# Patient Record
Sex: FEMALE | Race: WHITE | NOT HISPANIC OR LATINO | Employment: FULL TIME | URBAN - METROPOLITAN AREA
[De-identification: names, ages, dates, MRNs, and addresses within clinical notes are randomized per-mention and may not be internally consistent; named-entity substitution may affect disease eponyms.]

---

## 2020-06-04 RX ORDER — SPIRONOLACTONE 50 MG/1
TABLET, FILM COATED ORAL
Qty: 90 TABLET | Refills: 3 | OUTPATIENT
Start: 2020-06-04

## 2020-06-04 NOTE — TELEPHONE ENCOUNTER
Called patient to see if she needed a refill and she stated she no longer takes this spironolactone, orilissa helped with fluid retention etc...

## 2020-06-18 ENCOUNTER — TELEPHONE (OUTPATIENT)
Dept: OBSTETRICS AND GYNECOLOGY | Facility: CLINIC | Age: 45
End: 2020-06-18

## 2020-06-18 DIAGNOSIS — R10.2 PELVIC PAIN: Primary | ICD-10-CM

## 2020-06-18 NOTE — TELEPHONE ENCOUNTER
----- Message from Pushpa Wen sent at 6/18/2020 11:30 AM CDT -----  .Type:  Needs Medical Advice    Who Called:  Patient   Symptoms (please be specific):  medication not working  ( orisilla )  How long has patient had these symptoms:    Pharmacy name and phone #:    Would the patient rather a call back or a response via [facility]? call  Best Call Back Number:  [phone]  Additional Information:  Patient called in regards to maybe getting an high dose or if the medication is not working anymore.

## 2020-06-18 NOTE — TELEPHONE ENCOUNTER
Called and spoke with patient. Pt reports having lower back pain that radiates down legs for the past 2 weeks. She has been on the Orilissa since April 2019 and worked well. Not sure if she needs to increase her dose? What do you recommend pt to do?

## 2020-06-19 NOTE — TELEPHONE ENCOUNTER
Called patient to schedule appointments. Dr. Resendez had no availability until mid July. Scheduled pt with Jessica and u/s for evaluation. Vilma Moore

## 2020-06-24 ENCOUNTER — PROCEDURE VISIT (OUTPATIENT)
Dept: OBSTETRICS AND GYNECOLOGY | Facility: CLINIC | Age: 45
End: 2020-06-24
Payer: COMMERCIAL

## 2020-06-24 ENCOUNTER — OFFICE VISIT (OUTPATIENT)
Dept: OBSTETRICS AND GYNECOLOGY | Facility: CLINIC | Age: 45
End: 2020-06-24
Payer: COMMERCIAL

## 2020-06-24 ENCOUNTER — TELEPHONE (OUTPATIENT)
Dept: OBSTETRICS AND GYNECOLOGY | Facility: CLINIC | Age: 45
End: 2020-06-24

## 2020-06-24 VITALS
HEIGHT: 62 IN | WEIGHT: 176 LBS | SYSTOLIC BLOOD PRESSURE: 110 MMHG | DIASTOLIC BLOOD PRESSURE: 84 MMHG | BODY MASS INDEX: 32.39 KG/M2

## 2020-06-24 DIAGNOSIS — R10.2 PELVIC PAIN: ICD-10-CM

## 2020-06-24 DIAGNOSIS — N80.9 ENDOMETRIOSIS: Primary | ICD-10-CM

## 2020-06-24 PROCEDURE — 99214 PR OFFICE/OUTPT VISIT, EST, LEVL IV, 30-39 MIN: ICD-10-PCS | Mod: 25,S$GLB,, | Performed by: NURSE PRACTITIONER

## 2020-06-24 PROCEDURE — 99214 OFFICE O/P EST MOD 30 MIN: CPT | Mod: 25,S$GLB,, | Performed by: NURSE PRACTITIONER

## 2020-06-24 PROCEDURE — 3008F PR BODY MASS INDEX (BMI) DOCUMENTED: ICD-10-PCS | Mod: CPTII,S$GLB,, | Performed by: NURSE PRACTITIONER

## 2020-06-24 PROCEDURE — 3008F BODY MASS INDEX DOCD: CPT | Mod: CPTII,S$GLB,, | Performed by: NURSE PRACTITIONER

## 2020-06-24 PROCEDURE — 76830 TRANSVAGINAL US NON-OB: CPT | Mod: S$GLB,,, | Performed by: OBSTETRICS & GYNECOLOGY

## 2020-06-24 PROCEDURE — 76830 PR  ECHOGRAPHY,TRANSVAGINAL: ICD-10-PCS | Mod: S$GLB,,, | Performed by: OBSTETRICS & GYNECOLOGY

## 2020-06-24 RX ORDER — ELAGOLIX 150 MG/1
TABLET, FILM COATED ORAL
COMMUNITY
Start: 2020-05-25 | End: 2020-06-24

## 2020-06-24 RX ORDER — ELAGOLIX 200 MG/1
200 TABLET, FILM COATED ORAL 2 TIMES DAILY
Qty: 60 TABLET | Refills: 11 | Status: SHIPPED | OUTPATIENT
Start: 2020-06-24 | End: 2020-10-27 | Stop reason: SDUPTHER

## 2020-06-24 NOTE — PROGRESS NOTES
Subjective:       Patient ID: Ely Petersen is a 44 y.o. female.    Chief Complaint:  Pelvic Pain (Pt has hx of endometriosis. Onset 3 weeks. Pt reports a steady, stabbing pain in back and radiated down leg.)      History of Present Illness  HPI  lower back pain radiates to the pelvic area and down the legs, makes legs numb. Reports that it feels like her endo pain. Makes her tired, heating pad helps, taking aleve, tylenol and they dont help, 800 mg ibuprofen helps just for the day. Pain is a 6/10. Pain is constant. Did not injure back.    GYN & OB History  No LMP recorded (lmp unknown). Patient has had an ablation.   Date of Last Pap: No result found    OB History    Para Term  AB Living   0 0 0 0 0 0   SAB TAB Ectopic Multiple Live Births   0 0 0 0 0       Review of Systems  Review of Systems   Constitutional: Negative for activity change, appetite change, chills, fatigue and fever.   HENT: Negative for nasal congestion and tinnitus.    Eyes: Negative for visual disturbance.   Respiratory: Negative for cough and shortness of breath.    Cardiovascular: Negative for chest pain and palpitations.   Gastrointestinal: Negative for abdominal pain, bloating, blood in stool, constipation, nausea and vomiting.   Endocrine: Negative for diabetes, hair loss and hot flashes.   Genitourinary: Positive for pelvic pain. Negative for bladder incontinence, decreased libido, dysmenorrhea, dyspareunia, dysuria, flank pain, frequency, genital sores, hematuria, hot flashes, menorrhagia, menstrual problem, urgency, vaginal bleeding, vaginal discharge, vaginal pain, urinary incontinence, postcoital bleeding, postmenopausal bleeding, vaginal dryness and vaginal odor.        Radiates to the pelvic area and down the legs, makes legs numb. Reports that it feels like her endo pain. Makes her tired, heating pad helps, taking aleve, tylenol and they dont help, 800 mg ibuprofen helps just for the day. Pain is a 6/10. Pain is  constant. Did not injure back.  Has been on orilissa for about a year and it was helping until three weeks ago, affecting daily living and desire to do anything    Musculoskeletal: Positive for back pain. Negative for arthralgias, leg pain and myalgias.   Integumentary:  Negative for rash, acne, hair changes, mole/lesion, breast mass, nipple discharge, breast skin changes and breast tenderness.   Neurological: Negative for vertigo, syncope, numbness and headaches.   Hematological: Does not bruise/bleed easily.   Psychiatric/Behavioral: Negative for depression and sleep disturbance. The patient is not nervous/anxious.    Breast: Negative for asymmetry, lump, mass, mastodynia, nipple discharge, skin changes and tenderness          Objective:    Physical Exam:   Constitutional: She appears well-developed and well-nourished.    HENT:   Nose: No epistaxis.              Genitourinary:    Vagina and uterus normal.      Pelvic exam was performed with patient supine.   Cervix is normal. Right adnexum displays tenderness. Right adnexum displays no mass and no fullness. Left adnexum displays no mass, no tenderness and no fullness. No erythema, tenderness, bleeding, rectocele, cystocele or unspecified prolapse of vaginal walls in the vagina.    No foreign body in the vagina.      No signs of injury in the vagina.   Labial bartholins normal.Cervix exhibits no motion tenderness, no discharge and no friability.                        Assessment:     Endometriosis  Pelvic pain  Back pain          Plan:      Increase orilissa to 200 mg po bid and refer to thomasee per prestia

## 2020-06-24 NOTE — PATIENT INSTRUCTIONS
Living with Endometriosis     A hot bath may help relieve pain.    Once you know you have endometriosis, you can think about your options for treatment. Even after treatment, most women have symptoms off and on until menopause. Then, when monthly periods are over for good, symptoms tend to subside or disappear. In the meantime, there is a lot you can do to help yourself feel better.  Help with emotions  Along with cycles of pain, you may have emotional cycles or mood swings. You may feel frustrated, or depressed. Dont suffer in silence. Talking to someone you trust can really help. Also, spend time doing things you enjoy.  Pain control  Heat can help limit pain. Soak in a hot bath or use a heating pad. You may also find relief with yoga, meditation, or acupuncture. Acetaminophen and ibuprofen may also help. Those work best if taken just as pain begins. If needed, you may be given prescription medicine to reduce cramping and pain during periods. Keep track of your symptoms to help you anticipate and cope with the pain.  Nutrition  For some women, making certain changes in their diet seems to reduce symptoms:  · Eat less refined sugar and white flour.  · Eat more dairy and get adequate vitamin D.  · Choose whole-grain breads and cereals.  · Eat at least 5 fruits and vegetables each day.  · Talk with your health care provider about taking nutritional supplements.  Pregnancy  Following treatment, many women with endometriosis are able to become pregnant. Some of these women find that being pregnant relieves symptoms -- at least for a while.  Exercise  Frequent exercise can help control your symptoms. Try to exercise about 2 hours and 30 minutes a week. Doing so can help relieve pain, including cramps. Nonimpact choices may offer the most symptom relief. Try walking, swimming, or biking.  Talking about sex  Many women with endometriosis have pain during intercourse. To increase comfort, you may want to try new  positions for sex. Some times of the month may be better than others. Also, talk with your partner about other ways you can be intimate. Massage might be a good option for both of you.  Date Last Reviewed: 5/20/2015  © 5827-8913 [employer]. [address] [place] [address], [place] [address]. All rights reserved. This information is not intended as a substitute for professional medical care. Always follow your healthcare professional's instructions.      We will increase orilissa to 200 mg po bid and refer to Dr Crump

## 2020-06-24 NOTE — TELEPHONE ENCOUNTER
----- Message from Jessica Clements NP sent at 6/24/2020  2:59 PM CDT -----  Can you please refer her to Dr miramontes, DX chronic endo, failing on orilissa. I can not do it through the computer    Thanks   Jessica

## 2020-06-29 ENCOUNTER — TELEPHONE (OUTPATIENT)
Dept: OBSTETRICS AND GYNECOLOGY | Facility: CLINIC | Age: 45
End: 2020-06-29

## 2020-06-29 DIAGNOSIS — R10.2 PELVIC PAIN: Primary | ICD-10-CM

## 2020-06-29 NOTE — TELEPHONE ENCOUNTER
----- Message from Bhavana Aguillon sent at 6/29/2020  9:19 AM CDT -----  Regarding: Referral  Contact: Patient  Patient was calling to check the status of the referral Dr. Resendez was suppose to provide for her. Please call at Ph .[phone] (home)

## 2020-06-29 NOTE — TELEPHONE ENCOUNTER
I see there's a note that a referral was sent 6/24/20 but there's no referral in the chart. Was this a paper referral that was sent?

## 2020-06-29 NOTE — TELEPHONE ENCOUNTER
Called Dr. Perez's office to see if they had received the referral. I left message for someone to call with this information.

## 2020-10-19 ENCOUNTER — TELEPHONE (OUTPATIENT)
Dept: OBSTETRICS AND GYNECOLOGY | Facility: CLINIC | Age: 45
End: 2020-10-19

## 2020-10-19 NOTE — TELEPHONE ENCOUNTER
Ely came in June 30, she is saying she has not heard from the specialist in [place]. When I go to the referral I cannot find the physician name.

## 2020-10-19 NOTE — TELEPHONE ENCOUNTER
----- Message from Allyn Perez sent at 10/19/2020  8:50 AM CDT -----  Regarding: patient advice  Contact: patient  Patient would like to consult with nurse regarding GYN specialist she was referred to in [place]. Patient stated the specialist never called and she is still having the same problems. Please call back at [phone].

## 2020-10-26 ENCOUNTER — TELEPHONE (OUTPATIENT)
Dept: OBSTETRICS AND GYNECOLOGY | Facility: CLINIC | Age: 45
End: 2020-10-26

## 2020-10-26 NOTE — TELEPHONE ENCOUNTER
----- Message from Karime Ordaz sent at 10/26/2020 10:56 AM CDT -----  Regarding: pt  Pt would like to speak with a nurse. Pt has call a few times and no response. Please call back at [phone]

## 2020-10-27 DIAGNOSIS — N80.9 ENDOMETRIOSIS: ICD-10-CM

## 2020-10-27 RX ORDER — ELAGOLIX 200 MG/1
200 TABLET, FILM COATED ORAL 2 TIMES DAILY
Qty: 60 TABLET | Refills: 11 | Status: SHIPPED | OUTPATIENT
Start: 2020-10-27 | End: 2021-02-18

## 2020-10-27 NOTE — TELEPHONE ENCOUNTER
Called patient to let her know that we have samples of Orilissa. Pt aware and will come by office to .   Vilma Moore

## 2020-11-04 ENCOUNTER — TELEPHONE (OUTPATIENT)
Dept: OBSTETRICS AND GYNECOLOGY | Facility: CLINIC | Age: 45
End: 2020-11-04

## 2020-11-04 NOTE — TELEPHONE ENCOUNTER
----- Message from Allyn Perez sent at 11/4/2020 10:46 AM CST -----  Regarding: patient prescription  Contact: patient  Patient is requesting a call back in regards to a prescription that was called out for her. Patient stated the pharmacy is not answering the phone. Please call back at [phone] (home)

## 2020-11-04 NOTE — TELEPHONE ENCOUNTER
Ely went to the pharmacy to  her Orlissa, it requires a PA. Can we call something else out while we get ball rolling on the PA?

## 2020-11-04 NOTE — TELEPHONE ENCOUNTER
Spoke with patient and she will go to [facility] and check on script. If her insurance denies she will call us back and see what to do next.

## 2020-11-04 NOTE — TELEPHONE ENCOUNTER
----- Message from Allyn Perez sent at 11/4/2020 12:15 PM CST -----  Regarding: patient prescription  Contact: patient  Would like to notify nurse that the pharmacy stated that she is needing a prior authorization for the medication being that the dose changed. Please call back at [phone]

## 2020-11-04 NOTE — TELEPHONE ENCOUNTER
The only other thing to do is a progesterone only BC in the mean time and I thing she is supposed to see tomassee

## 2020-11-05 NOTE — TELEPHONE ENCOUNTER
----- Message from Lillian Alba sent at 10/29/2020  4:18 PM CDT -----  Regarding: Pt advice  Contact: [facility]  [facility] is calling to speak to nurse regarding missing information from referral. States that they need clinic phone number on cover sheet, demographics and clinic notes and test results. Please fax to [phone]. Please call back at [phone]//thank you acc

## 2020-12-23 ENCOUNTER — TELEPHONE (OUTPATIENT)
Dept: OBSTETRICS AND GYNECOLOGY | Facility: CLINIC | Age: 45
End: 2020-12-23

## 2020-12-23 NOTE — TELEPHONE ENCOUNTER
----- Message from Geri Altamirano sent at 12/23/2020  1:08 PM CST -----  Contact: self  She just left the specialist in [place]. Please call to schedule ultrasound with Dr Resendez. Please call back at [phone].

## 2020-12-28 ENCOUNTER — PROCEDURE VISIT (OUTPATIENT)
Dept: OBSTETRICS AND GYNECOLOGY | Facility: CLINIC | Age: 45
End: 2020-12-28
Payer: COMMERCIAL

## 2020-12-28 DIAGNOSIS — R10.2 PELVIC PAIN: ICD-10-CM

## 2020-12-28 DIAGNOSIS — R10.2 PELVIC PAIN: Primary | ICD-10-CM

## 2020-12-28 PROCEDURE — 76830 TRANSVAGINAL US NON-OB: CPT | Mod: S$GLB,,, | Performed by: OBSTETRICS & GYNECOLOGY

## 2020-12-28 PROCEDURE — 76830 PR  ECHOGRAPHY,TRANSVAGINAL: ICD-10-PCS | Mod: S$GLB,,, | Performed by: OBSTETRICS & GYNECOLOGY

## 2021-01-14 ENCOUNTER — TELEPHONE (OUTPATIENT)
Dept: OBSTETRICS AND GYNECOLOGY | Facility: CLINIC | Age: 46
End: 2021-01-14

## 2021-01-14 DIAGNOSIS — N85.7 HEMATOMETRA: ICD-10-CM

## 2021-01-14 DIAGNOSIS — R10.2 PELVIC PAIN: Primary | ICD-10-CM

## 2021-01-21 ENCOUNTER — TELEPHONE (OUTPATIENT)
Dept: OBSTETRICS AND GYNECOLOGY | Facility: CLINIC | Age: 46
End: 2021-01-21

## 2021-01-26 ENCOUNTER — PATIENT MESSAGE (OUTPATIENT)
Dept: OBSTETRICS AND GYNECOLOGY | Facility: CLINIC | Age: 46
End: 2021-01-26

## 2021-01-26 ENCOUNTER — TELEPHONE (OUTPATIENT)
Dept: OBSTETRICS AND GYNECOLOGY | Facility: CLINIC | Age: 46
End: 2021-01-26

## 2021-02-16 ENCOUNTER — TELEPHONE (OUTPATIENT)
Dept: OBSTETRICS AND GYNECOLOGY | Facility: CLINIC | Age: 46
End: 2021-02-16

## 2021-02-17 ENCOUNTER — TELEPHONE (OUTPATIENT)
Dept: OBSTETRICS AND GYNECOLOGY | Facility: CLINIC | Age: 46
End: 2021-02-17

## 2021-02-17 ENCOUNTER — PATIENT MESSAGE (OUTPATIENT)
Dept: OBSTETRICS AND GYNECOLOGY | Facility: CLINIC | Age: 46
End: 2021-02-17

## 2021-02-18 ENCOUNTER — TELEPHONE (OUTPATIENT)
Dept: OBSTETRICS AND GYNECOLOGY | Facility: CLINIC | Age: 46
End: 2021-02-18

## 2021-02-18 ENCOUNTER — OFFICE VISIT (OUTPATIENT)
Dept: OBSTETRICS AND GYNECOLOGY | Facility: CLINIC | Age: 46
End: 2021-02-18
Payer: COMMERCIAL

## 2021-02-18 DIAGNOSIS — N85.7 HEMATOMETRA: Primary | ICD-10-CM

## 2021-02-18 DIAGNOSIS — N80.9 ENDOMETRIOSIS: ICD-10-CM

## 2021-02-18 DIAGNOSIS — R10.2 PELVIC PAIN: ICD-10-CM

## 2021-02-18 PROCEDURE — 99499 UNLISTED E&M SERVICE: CPT | Mod: S$GLB,,, | Performed by: OBSTETRICS & GYNECOLOGY

## 2021-02-18 PROCEDURE — 99499 NO LOS: ICD-10-PCS | Mod: S$GLB,,, | Performed by: OBSTETRICS & GYNECOLOGY

## 2021-02-18 RX ORDER — OXYCODONE AND ACETAMINOPHEN 5; 325 MG/1; MG/1
1 TABLET ORAL EVERY 4 HOURS PRN
Qty: 30 TABLET | Refills: 0 | Status: SHIPPED | OUTPATIENT
Start: 2021-02-18 | End: 2023-10-10

## 2021-03-15 ENCOUNTER — HISTORICAL (OUTPATIENT)
Dept: ADMINISTRATIVE | Facility: HOSPITAL | Age: 46
End: 2021-03-15

## 2021-03-15 LAB
ABS NEUT (OLG): 4.95 X10(3)/MCL (ref 2.1–9.2)
BASOPHILS # BLD AUTO: 0 X10(3)/MCL (ref 0–0.2)
BASOPHILS NFR BLD AUTO: 0 %
BUN SERPL-MCNC: 12.5 MG/DL (ref 7–18.7)
CALCIUM SERPL-MCNC: 8.9 MG/DL (ref 8.4–10.2)
CHLORIDE SERPL-SCNC: 103 MMOL/L (ref 98–107)
CO2 SERPL-SCNC: 26 MMOL/L (ref 22–29)
CREAT SERPL-MCNC: 0.93 MG/DL (ref 0.55–1.02)
CREAT/UREA NIT SERPL: 13
EOSINOPHIL # BLD AUTO: 0 X10(3)/MCL (ref 0–0.9)
EOSINOPHIL NFR BLD AUTO: 1 %
ERYTHROCYTE [DISTWIDTH] IN BLOOD BY AUTOMATED COUNT: 12.9 % (ref 11.5–14.5)
EST CREAT CLEARANCE SER (OHS): 62.27 ML/MIN
GLUCOSE SERPL-MCNC: 66 MG/DL (ref 74–100)
HCT VFR BLD AUTO: 41.1 % (ref 35–46)
HGB BLD-MCNC: 13.1 GM/DL (ref 12–16)
IMM GRANULOCYTES # BLD AUTO: 0.02 10*3/UL
IMM GRANULOCYTES NFR BLD AUTO: 0 %
LYMPHOCYTES # BLD AUTO: 1.6 X10(3)/MCL (ref 0.6–4.6)
LYMPHOCYTES NFR BLD AUTO: 22 %
MCH RBC QN AUTO: 28.9 PG (ref 26–34)
MCHC RBC AUTO-ENTMCNC: 31.9 GM/DL (ref 31–37)
MCV RBC AUTO: 90.7 FL (ref 80–100)
MONOCYTES # BLD AUTO: 0.5 X10(3)/MCL (ref 0.1–1.3)
MONOCYTES NFR BLD AUTO: 6 %
NEUTROPHILS # BLD AUTO: 4.95 X10(3)/MCL (ref 2.1–9.2)
NEUTROPHILS NFR BLD AUTO: 70 %
PLATELET # BLD AUTO: 292 X10(3)/MCL (ref 130–400)
PMV BLD AUTO: 10.8 FL (ref 7.4–10.4)
POTASSIUM SERPL-SCNC: 3.7 MMOL/L (ref 3.5–5.1)
RBC # BLD AUTO: 4.53 X10(6)/MCL (ref 4–5.2)
SARS-COV-2 RNA RESP QL NAA+PROBE: NOT DETECTED
SODIUM SERPL-SCNC: 137 MMOL/L (ref 136–145)
WBC # SPEC AUTO: 7.1 X10(3)/MCL (ref 4.5–11)

## 2021-03-18 ENCOUNTER — HISTORICAL (OUTPATIENT)
Dept: MEDSURG UNIT | Facility: HOSPITAL | Age: 46
End: 2021-03-18

## 2021-03-18 LAB
GROUP & RH: NORMAL
POC BETA-HCG (QUAL): NEGATIVE

## 2021-03-19 LAB
ABS NEUT (OLG): 7.59 X10(3)/MCL (ref 2.1–9.2)
BASOPHILS # BLD AUTO: 0 X10(3)/MCL (ref 0–0.2)
BASOPHILS NFR BLD AUTO: 0 %
EOSINOPHIL # BLD AUTO: 0 X10(3)/MCL (ref 0–0.9)
EOSINOPHIL NFR BLD AUTO: 0 %
ERYTHROCYTE [DISTWIDTH] IN BLOOD BY AUTOMATED COUNT: 13.1 % (ref 11.5–17)
HCT VFR BLD AUTO: 36.3 % (ref 37–47)
HGB BLD-MCNC: 11.5 GM/DL (ref 12–16)
LYMPHOCYTES # BLD AUTO: 1.7 X10(3)/MCL (ref 0.6–4.6)
LYMPHOCYTES NFR BLD AUTO: 17 %
MCH RBC QN AUTO: 28.6 PG (ref 27–31)
MCHC RBC AUTO-ENTMCNC: 31.7 GM/DL (ref 33–36)
MCV RBC AUTO: 90.3 FL (ref 80–94)
MONOCYTES # BLD AUTO: 0.7 X10(3)/MCL (ref 0.1–1.3)
MONOCYTES NFR BLD AUTO: 7 %
NEUTROPHILS # BLD AUTO: 7.59 X10(3)/MCL (ref 2.1–9.2)
NEUTROPHILS NFR BLD AUTO: 76 %
PLATELET # BLD AUTO: 260 X10(3)/MCL (ref 130–400)
PMV BLD AUTO: 10.9 FL (ref 9.4–12.4)
RBC # BLD AUTO: 4.02 X10(6)/MCL (ref 4.2–5.4)
WBC # SPEC AUTO: 10 X10(3)/MCL (ref 4.5–11.5)

## 2022-04-10 ENCOUNTER — HISTORICAL (OUTPATIENT)
Dept: ADMINISTRATIVE | Facility: HOSPITAL | Age: 47
End: 2022-04-10
Payer: COMMERCIAL

## 2022-04-25 VITALS
HEIGHT: 62 IN | SYSTOLIC BLOOD PRESSURE: 103 MMHG | WEIGHT: 173.5 LBS | OXYGEN SATURATION: 98 % | BODY MASS INDEX: 31.93 KG/M2 | DIASTOLIC BLOOD PRESSURE: 74 MMHG

## 2022-05-03 NOTE — HISTORICAL [facility]
This is a historical note converted from Tin. Formatting and pictures may have been removed.  Please reference Tin for original formatting and attached multimedia. 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 Since our last visit, she was supposed to have surgery with Dr. Resendez, but this was moved back due to scheduling reasons and she called to get in sooner w/ me.? She has h/o left sigmoid rent intraop due to adhesions at time of LSO.?  ?   I reviewed operative report from 2016-- left colon adhesions, serosal bowel injury (superficial only) and LSO, R salpingectomies performed.? There is no mention of endometriosis, and the path of the left ovary showed benign corpus luteal/physiologic cysts.  She reports the last few days feels like labor pains moves from side to side in pelvis.? She is having difficulty with pain at work, although she sits.? Taking tramadol for pain which is not helping much.  ?   Pap 4/2019: NILM, HR HPV neg  (h/o HSIL, s/p CKC, normal since)  ?   US report reviewed?from Dr. Gifford office: 12/28/2020: hypoechoic area in uterus 0.4 x 0.4cm fluid within the endometrial canal vs. cyst.? Right ovary appears wnl.? [1]  ?  Physical Exam  General: NAD, A/Ox3  No neck masses- no thyromegaly  Respiratory:? CTA Bilaterally, no wheezes/rales/rhonchi, good inspiratory effort  Cardiac: Reg rate, irregular rhythm-- occ extra beats , no MRGs  Abdomen: soft, BS active, nondistended, nontender to palpation,?no masses palpated  Incisions:? well healed l/s (oophorectomy and ad)  Extremities: no edema, no calf tenderness bilaterally,?symmetric  ?   Prior pelvic exam:  External genitalia: Normal female anatomy, no masses/lesions. Normal appearing urethral meatus. Normal appearing external anus. No  lymphadenopathy.  Pelvic Floor:? levator ani muscles:?R>L ttp? Obturator m:*R spasm; ttp; left NT?? Piriformis m.:? **R>L ttp- R reproduces pain (also with left obturator)  Bimanual exam: Vagina with??good capacity. Uterus?6 cm in size, no cervical motion tenderness.?Some descent, mobile. No adnexal fullness/tenderness. RIGHT Ovary feels normal size; ? anterior;  Urethra and Bladder nontender.  Speculum exam: vaginal mucosa normal in appearance. Pink. No masses/lesions. Cervix well visualized, smooth in contour no masses or lesions. **Consistent with prior CKC**  ?? ?Os normal in appearance, no blood or discharge coming from the os. [2]  Assessment/Plan  1.?Hematometra?N85.7  Plan: ?TLH, possible r oophorectomy (previous LSO and R salpingectomy), cystoscopy  ?   We discussed various routes of hysterectomy including: vaginal, laparoscopic and abdominal with R/B of each reviewed in detail.? Also, I explained that removal of ovaries at this age poses risks of:? advanced cardiovascular disease (heart attack/stroke), osteoporosis, menopausal symptoms that may not be completed mitigated with hormonal therapy, and if no HRT, then earlier death from all causes.? Leaving ovaries would be more beneficial in her situation, with risks of needing re-operation of 5-8%.? We further discussed removal of fallopian tubes at time of hysterectomy to help reduce ovarian cancer risks.  Perioperative handout given regarding expectations and preparation pre- and post-operatively.??  clear liquids evening prior to surgery, NPO after midnight.  ?   We reviewed the following medications to stop: none  and those to?take the morning of surgery:?none?  *Of note pt has constipation and used mag citrate regularly- can start colace now, discussed hydration w/ water which she endorses she does not do alot of.  ?  ?   Post Operative Visit:??04/01/2021 10:25:00 AM  and 4/29/2021  Overnight stay x1 night at [facility]; her mom will be with her.  EKG today  **Due to irregular heart rhythm? *(appears normal to me)  Discussed residents to assist me; one at most.??  ?  ?   ?I personally reviewed the written consent form with her and all questions were answered.? Recovery time, and perioperative expectations were also reviewed.? Discussed risks of : bowel injury, pelvic infection, hematoma, persistence of pain, need to remove right ovary (will make decision based on appearance- risks of surgical menopause reviewed); among other risks all outlined by me that are listed in the written consent form.  ?  ?  2.?History of endometrial ablation?Z98.890  3.?Pelvic pain?R10.2  4.?Deep dyspareunia in female?N94.12   Problem List/Past Medical History  Ongoing  Deep dyspareunia in female  Endometriosis  Hematometra  History of endometrial ablation  Obesity  Pelvic pain  Historical  No qualifying data  Procedure/Surgical History  left ovary removed (2015)  cold knife conization (2012)  Novasure endometrial ablation (2011)  L/S nitesh salpingectomies (2010)  cholecystectomy (1998)  tonsillectomy (1987)   Medications  Inpatient  No active inpatient medications  Home  Fluzone PF Quadrivalent [id number] intramuscular suspension, 0.5 mL, IM, Once  Orilissa 200 mg oral tablet, 200 mg= 1 tab(s), Oral, BID  Toradol 10 mg oral tablet, 10 mg= 1 tab(s), Oral, q4hr, PRN, 1 refills,? ?Not taking  Allergies  traMADol?(Itch)  Social History  Abuse/Neglect  No, 01/14/2021  No, 12/23/2020  Alcohol  Wine, 1-2 times per week, 12/23/2020  Employment/School  Employed, Work/School description: [employer] 12/23/2020  Exercise  Exercise duration: 0., 12/23/2020  Home/Environment  Lives with Significant other. Living situation: Home/Independent., 12/23/2020    Never in , 12/23/2020  Nutrition/Health  Regular, 12/23/2020  Sexual  Sexually active: Yes. Number of current partners 1. Sexual orientation: Straight or heterosexual. Gender Identity Identifies as female. No, 12/23/2020  Substance  Use  Never, 12/23/2020  Tobacco  4 or less cigarettes(less than 1/4 pack)/day in last 30 days, No, 01/14/2021  4 or less cigarettes(less than 1/4 pack)/day in last 30 days, Cigarettes, No, Household tobacco concerns: No., 12/23/2020  Lab Results  CBC< BMP ordered today     [1]?GYN Return Visit Note; Ana TORREZ, May S 01/13/2021 16:51 CST  [2]?GYN Return Visit Note; Ana TORREZ, May S 01/13/2021 16:51 CST

## 2022-05-03 NOTE — HISTORICAL [facility] C[facility]
This is a historical note converted from [employer]. Formatting and pictures may have been removed.  Please reference Cerjose ramon for original formatting and attached multimedia. Admit and Discharge Dates  Admit Date: 03/18/2021  Discharge Date: 03/19/2021  Physicians  Attending Physician - Ana TORREZ, May S  Admitting Physician - Ana TORREZ, May S  Primary Care Physician - Physician MD, Non Staff  Discharge Diagnosis  1.?Pelvic adhesive disease?N73.6  ?  S/P hysterectomy?Z90.710  ?  Surgical Procedures  03/18/2021 - [id number] - Hysterectomy Total Laparoscopic  Immunizations  No immunizations recorded for this visit.  Admission Information  46 yo F G0 admitted for scheduled hysterectomy.  Hospital Course  Pt juan daniel solids, voiding spont. ambulating in room.? Pain well controlled with PO meds overnight.? No concerns.  Significant Findings  ?  Findings  uterus 6 wk size, anteflexed; liver upper abdomen without adhesions or lesions.? Appendix normal in appearance, no pelvic endometriosis seen.  Sigmoid colon/mesentery and serosa densely adherent from above left pelvic brim extending along left pelvic sidewall and adherent on bladder peritoneum.? Also densely adherent to entire left round ligament, left uterine cornua.  Once uncovered, there was a 1.5cm white area that appeared consistent with ovarian remnant in the ovarian fossa--- in order to completely remove this, I performed extensive ureterolysis of the entire left ureter from above the pelvic brim down to the cardinal ligament.  ?   Cystoscopy: no mucosal lesions, sutures, or defects.? Brisk bilateral efflux of urine from each ureteral orifice at end of case. [1]  ?  Operation  Total laparoscopic hysterectomy, left partial oophorectomy, laparoscopic enterolysis and *Ureterolysis, cystoscopy [2]  Time Spent on discharge  10 min  Objective  Vitals & Measurements  T:?37? ?C (Oral)? TMIN:?36.4? ?C (Temporal Artery)? TMAX:?37.2? ?C (Oral)? HR:?68(Peripheral)? RR:?18?  BP:?97/62? SpO2:?98%?  Physical Exam  ?  General: NAD, A/Ox3  Neck: no visible masses  HEENT: no lesions  Respiratory: CTAB, no wheezes, rales, or rhonchi bilaterally; good inspiratory effort  Cardiovascular: RRR no murmurs, rubs, or gallops  Abdomen: soft, nondistended, appr tender to palpation, no masses palpated, active bowel sounds; incisions c/d/i w/ skin glue; no signs of infection  Extremities: symmetric, no edema, no calf tenderness, SCDs in place bilaterally  ?  Lab Results  Test Name Test Result Date/Time   WBC 10.0 x10(3)/mcL 03/19/2021 03:59 CDT   Hgb 11.5 gm/dL (Low) 03/19/2021 03:59 CDT   Hct 36.3 % (Low) 03/19/2021 03:59 CDT   Platelet 260 x10(3)/mcL 03/19/2021 03:59 CDT   Patient Discharge Condition  good  Discharge Disposition  home w/ her mother ( will be at home when she arrives)  ?  Pt has Rx at home.? Discussed miralax prophylactically, expect BM by Sunday.? Can do mag citrate if needed.  She has my contact information to call with any concerns.   Discharge Medication Reconciliation  Prescribed  acetaminophen-oxyCODONE (Percocet 5/325)?1 tab(s), Oral, q3hr, PRN pain, moderate  Continue  acetaminophen-oxyCODONE (Percocet 5/325 oral tablet)?1 tab(s), Oral, q4hr, PRN for pain  docusate (Colace 100 mg oral capsule)?100 mg, Oral, BID  ketorolac (Toradol 10 mg oral tablet)?10 mg, Oral, q4hr, PRN for pain  melatonin (Melatonin 10 mg oral capsule)?See Instructions  Education and Orders Provided  Discharge - 03/19/21 6:47:00 CDT, Home?  Follow up  Sandra Perez  in 2 weeks as scheduled with Dr. Perez  Car Seat Challenge  No Qualifying Data     [1]?Op Note; Ana TORREZ, May S 03/18/2021 10:29 CDT  [2]?Op Note; Ana TORREZ, May S 03/18/2021 10:29 CDT

## 2022-05-03 NOTE — HISTORICAL [facility] CERNER
This is a historical note converted from Cerjose ramon. Formatting and pictures may have been removed.  Please reference Cerner for original formatting and attached multimedia. Indication for Surgery  44 yo F G0 with worsening pelvic pain x 2 years, dyspareunia - s/p endometrial ablation, nitesh salpingectomies some years ago with findings of small hematometra.? Additionally, she had prior laparoscopy for left oophorectomy/endometriosis and left sigmoid noted to be densely adherent to this area.? She was consented on options and wanted to proceed with hysterectomy.? Informed consent was obtained pre-operatively.  Preoperative Diagnosis  pelvic pain  post tubal post ablative syndrome  hematometra  endometriosis  Postoperative Diagnosis  pelvic pain  post tubal post ablative syndrome  hematometra  sigmoid adhesions (dense)  left ovarian remnant  Operation  Total laparoscopic hysterectomy, left partial oophorectomy, laparoscopic enterolysis and *Ureterolysis, cystoscopy  Surgeon(s)  Dr. ALMAZ Perez  Assistant  Dr. JENNIFER Chamberlain  Anesthesia  GETA; 1% lidocaine into trocar sites  Estimated Blood Loss  150cc  ?  IVF: 1200cc  Urine Output  350cc  Findings  uterus 6 wk size, anteflexed; liver upper abdomen without adhesions or lesions.? Appendix normal in appearance, no pelvic endometriosis seen.  Sigmoid colon/mesentery and serosa densely adherent from above left pelvic brim extending along left pelvic sidewall and adherent on bladder peritoneum.? Also densely adherent to entire left round ligament, left uterine cornua.  Once uncovered, there was a 1.5cm white area that appeared consistent with ovarian remnant in the ovarian fossa--- in order to completely remove this, I performed extensive ureterolysis of the entire left ureter from above the pelvic brim down to the cardinal ligament.  ?  Cystoscopy: no mucosal lesions, sutures, or defects.? Brisk bilateral efflux of urine from each ureteral orifice at end of  case.  Specimen(s)  uterus, cervix, suspected partial left ovary  Complications  uterine perforation during manipulator insertion - inherent to case details due to intra-uterine synechiae  Technique  ?The patient was taken to the OR placed in dorsal supine position. ?She received Ancef for infection prophylaxis and SCDs for DVT prophylaxis. ?General anesthesia was then obtained. She was prepped and draped in the normal sterile fashion, arms were padded and tucked at her sides and legs placed in the dorsal lithotomy position. ?Attention was paid to the vagina where a marcus catheter was inserted into the bladder. A speculum was placed. ?The cervix was grasped with a single tooth tenaculum and dilated with a small Hegar dilator- I encountered some?dense intrauterine scarring and was careful to dilate in midplane position to open the internal os. Once sufficiently opened?the uterus was sound to 7cm. The YOUNG manipulator with Koh cup was assembled and inserted into the uterus in standard fashion.  ?????  ?????Attention was then paid to the abdomen. ?1% Lidocaine plain was injected into the umbilicus while a 5mm incision made. ?The abdominal wall was lifted upward and an 5mm visiport trocar with the 0-degree laparoscope was inserted. Placement was confirmed with the laparoscope. ?The abdomen and pelvis were insufflated with CO2 gas to a level of 15 mmHg. ?No visceral or vascular injury occurred with entry into abdomen. ?Survey of the upper abdomen was taken. At that time, Trendelenburg position was obtained to keep the bowel out of the operative field. ?A survey of the abdomen and pelvis was performed with findings as above.??Once the small right cornual perforation was noted (2mm), I ran the small and large bowel that was in the?pelvis and at the pelvic brim.? I carefully inspected both sides, and did not see any injuries or succus in the pelvis.? This was suspected to occur with the uterine sound, which?is a blunt  instrument.? The uterine manipulator was not perforated through this area.  ? ??Three additional trocars were then placed under direct laparoscopic visualization in the same fashion as the first using local prior to incision. One 5mm left midquadrant, and two 5mm right lower and right mid quadrants.? The umbilical trocar was exchanged for a 12mm under visualization and the skin incision enlarged.???  ?????  ?????First, attention was paid to the left sigmoid adhesions.? Sharp dissection with scissors was used to release all of these adhesions- no energy was used.? This required approximately 25 minutes of operating time just to reveal the left lopez uterus, bladder peritoneum and left pelvic sidewall.? There was some entry into the left lateral sigmoid mesentery and some bleeding occurred within the excess adipose tissue here.? This appears to be adhesions from prior surgery, as some planes were not usual planes.? I coagulated small areas with a bipolar forcep (gently).? Once the left ovarian piece (suspected) was seen, we then opened the left round ligament with the Harmonic device and carried this parallel to the infundibulopelvic vessels (there was a visible vessel going into this area).? We then spent approximately 20 minutes looking for and dissecting the left pararectal space and the medial broad liagment to find the ureter.? I could not see it, therefore I then mobilized the sigmoid colon off of the left pelvic brim, isolated the area of the common iliac vein/artery, and released more sigmoid mesentery from this area (non-anatomical adhesions) to visualize the ureter at the pelvic brim.? Once identified, I then bluntly dissected this from the pelvic brim down to the cardinal ligament.? The ureter was now well beneath the area of broad/IP vessels and ovarian appearing tissue and a window had been created above the ureter.? Overall, ureterolysis required approximately 20 minutes additional to the already extended  time in lysing the?sigmoid adhesions.  ?   ? The ovarian vessels were then coagulated and transected at least 2cm away from the ovary.? The ureter was noted to be well beneath this.??The posterior peritoneum was then transected down to the level of the Koh ring.? The anterior vesicouterine space was opened, the uterine vessels?skeletonized and coagulated.? They were then transected with?the Harmonic device.?Additional anterior vesicouterine dissection occurred to release vesicouterine space.?  ?  Attention then paid to the right pelvic sidewall where the round ligament was transected using the Harmonic device.? This incision was carried?down the uretoovarian ligament in the same fashion.? The?posterior peritoneum was?transected down to the level of the Koh cup and the anterior peritoneum as well.? The uterine vessels were isolated and coagulated with the?bipolar device.??The bipolar Vini device was used to coagulate the uterine vessels, which were then?transected with the ultrasonic device at the level of the Koh ring.? The?vesicouterine space was?densely adherent in the midline, and slight increased vascularity?which were controlled?with the?harmonic device.? Care was taken to remain away from the bladder.???  ?????Once the bladder was well below the area of planned colpotomy,?the colpotomy was performed with the ultrasonic device and upward traction on the uterus. ?This was carried circumferentially around until the cervix was freed from the superior aspect of the vagina. ?The uterus, cervix, and portion of suspected left ovary were then removed vaginally.? The pneumo-occluder was then replaced vaginally.? The specimen sent for permanent pathologic review.???  ?   ?????The vaginal cuff was closed with the?0 barbed PDO (Stratifix) suture in two layers (2 separate sutures used). ?The initial layer was of vaginal epithelium, followed by closing?the anterior peritoneum and pubocervical fascia to the posterior  peritoneum.??Both uterosacral ligaments were incorporated?for cuff suspension.??The pelvis was then copiously irrigated, irrigant removed, and hemostasis was confirmed.? Due to the extensive dissection, and small oozing in the left mesenteric space as well as raw peritoneal edges of the entire left pelvic sidewall from enterolysis and ureterolysis- I placed SurgiFoam in this area.? Trendelenburg was reversed before this, the pelvis irrigated, bowel reinspected, and appendix viewed.? All findings above.? She was then placed in flat/supine position.  ?   The midline?incision was then closed with a 0-Vicryl suture using a Sebastián Sarah needle under direct visualization. ?All instruments were removed and all trocars. ?Five manual breaths were given for release of the pneumoperitoneum through the remaining trocar, which was then removed. ?The skin was reapproximated with 4-0 monocryl in a subcuticular fashion, and covered with?skin glue.? Repeat injection of 1% lidocaine was placed in each incision.  ?????  ??Attention was then paid to the cystoscopy. ?Rodriguez was removed and the 70 degree cystoscope was introduced into the bladder without difficulty. The bladder was inspected and noted to be intact without evidence of trauma. There was noted to be efflux of urine from bilateral ureteral orifices; no sutures stones or masses noted. The cystoscope was removed, the bladder drained. The vagina was copiously irrigated and irrigant removed. ?Vaginal cuff inspected and intact/hemostatic.  ?The patient tolerated the procedure well. ?The needle, sponge and instrument counts were correct. The patient tolerated the procedure?well and was transferred?to the recovery?room in good condition.

## 2022-05-03 NOTE — HISTORICAL [facility] CERNER
This is a historical note converted from Tin. Formatting and pictures may have been removed.  Please reference Tin for original formatting and attached multimedia. Chief Complaint  scheduled surgery  ?   History of Present Illness  46 yo F G0?here for : Total laparoscopic hysterectomy, cystoscopy; possible right oophorectomy:? for pelvic pain, suspected hematometra since ablation/BTL, dyspareunia.? Since our last visit, she was supposed to have surgery with Dr. Resendez, but this was moved back due to scheduling reasons and she called to get in sooner w/ me.? She has h/o left sigmoid rent intraop due to adhesions at time of LSO.?  ?   I reviewed operative report from 2016-- left colon adhesions, serosal bowel injury (superficial only) and LSO, R salpingectomies performed.? There is no mention of endometriosis, and the path of the left ovary showed benign corpus luteal/physiologic cysts.  She reports the last few days feels like labor pains moves from side to side in pelvis.? She is having difficulty with pain at work, although she sits.? Taking tramadol for pain which is not helping much.  ?   Pap 4/2019: NILM, HR HPV neg  (h/o HSIL, s/p CKC, normal since)  ?   US report reviewed?from Dr. Gifford office: 12/28/2020: hypoechoic area in uterus 0.4 x 0.4cm fluid within the endometrial canal vs. cyst.? Right ovary appears wnl.? [1]  ?  Physical Exam  36.9? P ;69? O2: 98%? 106/64  ?  General: NAD, A/Ox3  No neck masses- no thyromegaly  Respiratory:? CTA Bilaterally, no wheezes/rales/rhonchi, good inspiratory effort  Cardiac: Reg rate, reg rhythm this am (prior irreg/? PVCs)  Abdomen: soft, BS active, nondistended, nontender to palpation,?no masses palpated  Incisions:? well healed l/s (oophorectomy and ad)  Extremities: no edema, no calf tenderness bilaterally,?symmetric  ?  Prior pelvic exam:  External genitalia: Normal female anatomy, no masses/lesions. Normal appearing urethral meatus. Normal appearing  external anus. No lymphadenopathy.  Pelvic Floor:? levator ani muscles:?R>L ttp? Obturator m:*R spasm; ttp; left NT?? Piriformis m.:? **R>L ttp- R reproduces pain (also with left obturator)  Bimanual exam: Vagina with??good capacity. Uterus?6 cm in size, no cervical motion tenderness.?Some descent, mobile. No adnexal fullness/tenderness. RIGHT Ovary feels normal size; ? anterior;  Urethra and Bladder nontender.  Speculum exam: vaginal mucosa normal in appearance. Pink. No masses/lesions. Cervix well visualized, smooth in contour no masses or lesions. **Consistent with prior CKC**  ?? ?Os normal in appearance, no blood or discharge coming from the os. [2]  Assessment/Plan  1.?Hematometra?N85.7  ?  ???Plan: ?TLH, possible r oophorectomy (previous LSO and R salpingectomy), cystoscopy  ??  Post Operative Visit:??04/01/2021 10:25:00 AM and 4/29/2021  ??Overnight stay x1 night at [facility]; her mom will be with her.  EKG done  SCDs for DVT ppx  Ancef 2g IV ordered  UPT neg. ?today  T&S pending ?  ?  ?  2.?History of endometrial ablation?Z98.890  ?  3.?Pelvic pain?R10.2  ?  4.?Deep dyspareunia in female?N94.12  ? Problem List/Past Medical History  Ongoing  ??Deep dyspareunia in female  ?Endometriosis  ?Hematometra  ?History of endometrial ablation  ?Obesity  ?Pelvic pain  Historical  ??No qualifying data  Procedure/Surgical History  ?left ovary removed (2015)  cold knife conization (2012)  Novasure endometrial ablation (2011)  L/S nitesh salpingectomies (2010)  cholecystectomy (1998)  tonsillectomy (1987)   ?  Medications  Inpatient  ??No active inpatient medications  Home  ??Fluzone PF Quadrivalent 5030-6308 intramuscular suspension, 0.5 mL, IM, Once  ??Orilissa 200 mg oral tablet, 200 mg= 1 tab(s), Oral, BID  ??Toradol 10 mg oral tablet, 10 mg= 1 tab(s), Oral, q4hr, PRN, 1 refills,? ?Not taking  Allergies  traMADol?(Itch)  Social History  Abuse/Neglect  ?No, 01/14/2021  ?No, 12/23/2020  Alcohol  ?Wine, 1-2 times per week,  12/23/2020  Employment/School  ?Employed, Work/School description: [employer] ., 12/23/2020  Exercise  ?Exercise duration: 0., 12/23/2020  Home/Environment  ?Lives with Significant other. Living situation: Home/Independent., 12/23/2020    ?Never in , 12/23/2020  Nutrition/Health  ?Regular, 12/23/2020  Sexual  ?Sexually active: Yes. Number of current partners 1. Sexual orientation: Straight or heterosexual. Gender Identity Identifies as female. No, 12/23/2020  Substance Use  ?Never, 12/23/2020  Tobacco  ?4 or less cigarettes(less than 1/4 pack)/day in last 30 days, No, 01/14/2021  ?4 or less cigarettes(less than 1/4 pack)/day in last 30 days, Cigarettes, No, Household tobacco concerns: No., 12/23/2020  Lab Results  ?  CBC< BMP ordered toda

## 2023-10-11 ENCOUNTER — OFFICE VISIT (OUTPATIENT)
Dept: OBSTETRICS AND GYNECOLOGY | Facility: CLINIC | Age: 48
End: 2023-10-11
Payer: COMMERCIAL

## 2023-10-11 VITALS
HEART RATE: 79 BPM | BODY MASS INDEX: 31.91 KG/M2 | SYSTOLIC BLOOD PRESSURE: 128 MMHG | WEIGHT: 169 LBS | DIASTOLIC BLOOD PRESSURE: 79 MMHG | HEIGHT: 61 IN

## 2023-10-11 DIAGNOSIS — Z80.3 FAMILY HISTORY OF BREAST CANCER: ICD-10-CM

## 2023-10-11 DIAGNOSIS — Z12.39 ENCOUNTER FOR SCREENING FOR MALIGNANT NEOPLASM OF BREAST, UNSPECIFIED SCREENING MODALITY: Primary | ICD-10-CM

## 2023-10-11 DIAGNOSIS — Z00.00 ENCOUNTER FOR WELLNESS EXAMINATION: ICD-10-CM

## 2023-10-11 PROCEDURE — 99396 PR PREVENTIVE VISIT,EST,40-64: ICD-10-PCS | Mod: S$GLB,,, | Performed by: OBSTETRICS & GYNECOLOGY

## 2023-10-11 PROCEDURE — 3074F PR MOST RECENT SYSTOLIC BLOOD PRESSURE < 130 MM HG: ICD-10-PCS | Mod: CPTII,S$GLB,, | Performed by: OBSTETRICS & GYNECOLOGY

## 2023-10-11 PROCEDURE — 3008F BODY MASS INDEX DOCD: CPT | Mod: CPTII,S$GLB,, | Performed by: OBSTETRICS & GYNECOLOGY

## 2023-10-11 PROCEDURE — 1159F PR MEDICATION LIST DOCUMENTED IN MEDICAL RECORD: ICD-10-PCS | Mod: CPTII,S$GLB,, | Performed by: OBSTETRICS & GYNECOLOGY

## 2023-10-11 PROCEDURE — 1159F MED LIST DOCD IN RCRD: CPT | Mod: CPTII,S$GLB,, | Performed by: OBSTETRICS & GYNECOLOGY

## 2023-10-11 PROCEDURE — 3078F DIAST BP <80 MM HG: CPT | Mod: CPTII,S$GLB,, | Performed by: OBSTETRICS & GYNECOLOGY

## 2023-10-11 PROCEDURE — 3078F PR MOST RECENT DIASTOLIC BLOOD PRESSURE < 80 MM HG: ICD-10-PCS | Mod: CPTII,S$GLB,, | Performed by: OBSTETRICS & GYNECOLOGY

## 2023-10-11 PROCEDURE — 3008F PR BODY MASS INDEX (BMI) DOCUMENTED: ICD-10-PCS | Mod: CPTII,S$GLB,, | Performed by: OBSTETRICS & GYNECOLOGY

## 2023-10-11 PROCEDURE — 99396 PREV VISIT EST AGE 40-64: CPT | Mod: S$GLB,,, | Performed by: OBSTETRICS & GYNECOLOGY

## 2023-10-11 PROCEDURE — 3074F SYST BP LT 130 MM HG: CPT | Mod: CPTII,S$GLB,, | Performed by: OBSTETRICS & GYNECOLOGY

## 2023-10-11 RX ORDER — HYDROQUINONE 40 MG/G
CREAM TOPICAL
COMMUNITY
Start: 2023-09-12

## 2023-10-11 NOTE — PROGRESS NOTES
Subjective:      Patient ID: Ely Petersen is a 48 y.o. female.    Chief Complaint:  Well Woman      History of Present Illness  HPI  This is a 48 year old female coming in for her annual exam. She also has complaints of hot flashes      GYN & OB History  No LMP recorded (lmp unknown). Patient has had a hysterectomy.   Date of Last Pap: No result found    OB History    Para Term  AB Living   0 0 0 0 0 0   SAB IAB Ectopic Multiple Live Births   0 0 0 0 0       Review of Systems  Review of Systems   Constitutional:  Negative for fatigue, fever and unexpected weight change.   Respiratory:  Negative for cough and shortness of breath.    Cardiovascular:  Negative for chest pain, palpitations and leg swelling.   Gastrointestinal:  Negative for abdominal pain, bloating, blood in stool, constipation, diarrhea, nausea and vomiting.   Genitourinary:  Positive for hot flashes. Negative for decreased libido, dysmenorrhea, dyspareunia, dysuria, frequency, genital sores, hematuria, menorrhagia, menstrual problem, pelvic pain, urgency, vaginal discharge, urinary incontinence and vaginal odor.   Musculoskeletal:  Negative for arthralgias and joint swelling.   Integumentary:  Negative for rash, mole/lesion, breast mass, nipple discharge, breast skin changes and breast tenderness.   Psychiatric/Behavioral: Negative.     Breast: Negative for asymmetry, lump, mass, nipple discharge, skin changes and tenderness         Objective:     Physical Exam:   Constitutional: She appears well-developed and well-nourished.      Neck: No thyroid mass and no thyromegaly present.     Pulmonary/Chest: Chest wall is not dull to percussion. She exhibits no mass, no tenderness, no bony tenderness, no laceration, no crepitus, no edema, no deformity, no swelling and no retraction. Right breast exhibits no inverted nipple, no mass, no nipple discharge, no skin change, no tenderness, presence, no bleeding and no swelling. Left breast exhibits  no inverted nipple, no mass, no nipple discharge, no skin change, no tenderness, presence, no bleeding and no swelling. Breasts are symmetrical.        Abdominal: Soft. Bowel sounds are normal. She exhibits no distension. There is no abdominal tenderness. Hernia confirmed negative in the right inguinal area and confirmed negative in the left inguinal area.     Genitourinary:    Vagina and rectum normal.      Pelvic exam was performed with patient supine.   Labial bartholins normal.There is no rash, tenderness, lesion or injury on the right labia. There is no rash, tenderness, lesion or injury on the left labia. Right adnexum displays no mass, no tenderness and no fullness. Left adnexum displays no mass, no tenderness and no fullness. Vaginal cuff normal.  No erythema,  no vaginal discharge, tenderness, bleeding, rectocele, cystocele or unspecified prolapse of vaginal walls in the vagina.    No foreign body in the vagina.      No signs of injury in the vagina.   Cervix is absent.Uterus is absent.                Skin: Skin is warm and dry.    Psychiatric: She has a normal mood and affect. Her speech is normal and behavior is normal.    Chaperone present        Assessment:     1. Encounter for screening for malignant neoplasm of breast, unspecified screening modality    2. Family history of breast cancer    3. Encounter for wellness examination            Plan:     Encounter for screening for malignant neoplasm of breast, unspecified screening modality  -     Mammo Digital Screening Bilat w/ Aleksey; Future; Expected date: 10/11/2023    Family history of breast cancer  -     Mammo Digital Screening Bilat w/ Aleksey; Future; Expected date: 10/11/2023    Encounter for wellness examination      Will review her labs and decide if she needs hrt or a low dose of testosterone supplementation

## 2023-10-12 ENCOUNTER — TELEPHONE (OUTPATIENT)
Dept: GASTROENTEROLOGY | Facility: CLINIC | Age: 48
End: 2023-10-12

## 2023-10-12 ENCOUNTER — PATIENT MESSAGE (OUTPATIENT)
Dept: OBSTETRICS AND GYNECOLOGY | Facility: CLINIC | Age: 48
End: 2023-10-12
Payer: COMMERCIAL

## 2023-10-12 DIAGNOSIS — Z12.11 SCREENING FOR MALIGNANT NEOPLASM OF COLON: ICD-10-CM

## 2023-10-12 DIAGNOSIS — Z86.010 HISTORY OF COLON POLYPS: Primary | ICD-10-CM

## 2023-10-13 ENCOUNTER — PATIENT MESSAGE (OUTPATIENT)
Dept: OBSTETRICS AND GYNECOLOGY | Facility: CLINIC | Age: 48
End: 2023-10-13
Payer: COMMERCIAL

## 2023-10-23 ENCOUNTER — PATIENT MESSAGE (OUTPATIENT)
Dept: OBSTETRICS AND GYNECOLOGY | Facility: CLINIC | Age: 48
End: 2023-10-23
Payer: COMMERCIAL

## 2024-03-06 ENCOUNTER — PATIENT MESSAGE (OUTPATIENT)
Dept: OBSTETRICS AND GYNECOLOGY | Facility: CLINIC | Age: 49
End: 2024-03-06
Payer: COMMERCIAL

## 2024-05-17 ENCOUNTER — TELEPHONE (OUTPATIENT)
Dept: GASTROENTEROLOGY | Facility: CLINIC | Age: 49
End: 2024-05-17
Payer: COMMERCIAL

## 2024-05-17 VITALS — HEIGHT: 61 IN | WEIGHT: 171 LBS | BODY MASS INDEX: 32.28 KG/M2

## 2024-05-17 DIAGNOSIS — Z86.010 HISTORY OF COLON POLYPS: Primary | ICD-10-CM

## 2024-05-17 DIAGNOSIS — Z12.11 SCREENING FOR MALIGNANT NEOPLASM OF COLON: ICD-10-CM

## 2024-05-17 NOTE — TELEPHONE ENCOUNTER
"[facility]  [address] [place] [address]  Phone: [phone]  Fax: [phone]    History & Physical         Provider: Dr. Kirti Henry    Patient Name: Ely RÍOS (age):1975  48 y.o.           Gender: female   Phone: [phone]     Referring Physician: Tran Palmer     Vital Signs:   Height - 5' 1"  Weight - 171 lb  BMI -  32.31    Plan: Colonoscopy @ [facility]    Encounter Diagnoses   Name Primary?    History of colon polyps Yes    Screening for malignant neoplasm of colon            History:      Past Medical History:   Diagnosis Date    Abnormal Pap smear of cervix     HSIL, ASCUS    B12 deficiency     Endometriosis     Mild dysplasia of cervix (NATTY I)       Past Surgical History:   Procedure Laterality Date    CERVICAL BIOPSY  W/ LOOP ELECTRODE EXCISION      CHOLECYSTECTOMY      ENDOMETRIAL ABLATION      LAPAROSCOPIC HYSTERECTOMY      w/ partial left oopherectomy    LAPAROSCOPIC OOPHORECTOMY Left     LUMBAR DISCECTOMY      L4/L5    Lysis of Colon Adhension      SALPINGECTOMY Right     TUBAL LIGATION        Medication List with Changes/Refills   Current Medications    CYANOCOBALAMIN 1,000 MCG/ML INJECTION    1,000 mcg every 28 days.    HYDROQUINONE 4 % CREA    APPLY TO DARK SPOTS TWICE A DAY FOR 3 MONTHS    SOD SULF-POT CHLORIDE-MAG SULF (SUTAB) 1.479-0.188- 0.225 GRAM TABLET    Take 12 tablets by mouth once daily. Take according to package instructions with indicated amount of water. No breakfast day before test. May substitute with Suprep, Clenpiq, Plenvu, Moviprep or GoLytely based on Rx plan and patient preference.      Review of patient's allergies indicates:   Allergen Reactions    Tramadol Itching      Family History   Problem Relation Name Age of Onset    Breast cancer Maternal Grandmother  70 - 79    Diabetes Father      Colon cancer Neg Hx      Ovarian cancer Neg Hx "      Uterine cancer Neg Hx      Prostate cancer Neg Hx      Pancreatic cancer Neg Hx      Melanoma Neg Hx        Social History     Tobacco Use    Smoking status: Some Days     Types: Cigarettes    Smokeless tobacco: Never    Tobacco comments:     Since high school   Substance Use Topics    Alcohol use: Yes     Comment: rare    Drug use: Never        Physical Examination:     General Appearance:___________________________  HEENT: _____________________________________  Abdomen:____________________________________  Heart:________________________________________  Lungs:_______________________________________  Extremities:___________________________________  Skin:_________________________________________  Endocrine:____________________________________  Genitourinary:_________________________________  Neurological:__________________________________      Patient has been evaluated immediately prior to sedation and is medically cleared for endoscopy with IVCS as an ASA class: ______      Physician Signature: _________________________       Date: ________  Time: ________

## 2024-05-23 ENCOUNTER — OUTSIDE PLACE OF SERVICE (OUTPATIENT)
Dept: GASTROENTEROLOGY | Facility: CLINIC | Age: 49
End: 2024-05-23

## 2024-05-23 LAB — CRC RECOMMENDATION EXT: NORMAL

## 2024-05-23 PROCEDURE — 45385 COLONOSCOPY W/LESION REMOVAL: CPT | Mod: 33,,, | Performed by: INTERNAL MEDICINE

## 2024-06-02 ENCOUNTER — TELEPHONE (OUTPATIENT)
Dept: GASTROENTEROLOGY | Facility: CLINIC | Age: 49
End: 2024-06-02
Payer: COMMERCIAL

## 2024-06-13 ENCOUNTER — DOCUMENTATION ONLY (OUTPATIENT)
Dept: GASTROENTEROLOGY | Facility: CLINIC | Age: 49
End: 2024-06-13
Payer: COMMERCIAL

## 2024-10-30 ENCOUNTER — OFFICE VISIT (OUTPATIENT)
Dept: OBSTETRICS AND GYNECOLOGY | Facility: CLINIC | Age: 49
End: 2024-10-30
Payer: COMMERCIAL

## 2024-10-30 VITALS
SYSTOLIC BLOOD PRESSURE: 118 MMHG | HEIGHT: 61 IN | BODY MASS INDEX: 29.38 KG/M2 | DIASTOLIC BLOOD PRESSURE: 84 MMHG | WEIGHT: 155.63 LBS | HEART RATE: 81 BPM

## 2024-10-30 DIAGNOSIS — R68.82 DECREASED LIBIDO: ICD-10-CM

## 2024-10-30 DIAGNOSIS — Z12.39 ENCOUNTER FOR SCREENING FOR MALIGNANT NEOPLASM OF BREAST, UNSPECIFIED SCREENING MODALITY: Primary | ICD-10-CM

## 2024-10-30 DIAGNOSIS — Z00.00 ENCOUNTER FOR WELLNESS EXAMINATION: ICD-10-CM

## 2024-10-30 DIAGNOSIS — N89.8 VAGINAL DRYNESS: ICD-10-CM

## 2024-10-30 DIAGNOSIS — N95.2 ATROPHIC VAGINITIS: ICD-10-CM

## 2024-10-30 PROCEDURE — 3008F BODY MASS INDEX DOCD: CPT | Mod: CPTII,,, | Performed by: OBSTETRICS & GYNECOLOGY

## 2024-10-30 PROCEDURE — 3074F SYST BP LT 130 MM HG: CPT | Mod: CPTII,,, | Performed by: OBSTETRICS & GYNECOLOGY

## 2024-10-30 PROCEDURE — 99396 PREV VISIT EST AGE 40-64: CPT | Mod: S$PBB,,, | Performed by: OBSTETRICS & GYNECOLOGY

## 2024-10-30 PROCEDURE — 3079F DIAST BP 80-89 MM HG: CPT | Mod: CPTII,,, | Performed by: OBSTETRICS & GYNECOLOGY

## 2024-10-30 PROCEDURE — 1159F MED LIST DOCD IN RCRD: CPT | Mod: CPTII,,, | Performed by: OBSTETRICS & GYNECOLOGY

## 2024-10-30 RX ORDER — TRAZODONE HYDROCHLORIDE 50 MG/1
TABLET ORAL
COMMUNITY
Start: 2024-08-15

## 2024-10-30 RX ORDER — DARIDOREXANT 50 MG/1
TABLET, FILM COATED ORAL
COMMUNITY
Start: 2024-09-30

## 2024-10-30 RX ORDER — PRASTERONE 6.5 MG/1
6.5 INSERT VAGINAL NIGHTLY
Qty: 30 EACH | Refills: 12 | Status: SHIPPED | OUTPATIENT
Start: 2024-10-30

## 2024-10-30 RX ORDER — LINACLOTIDE 72 UG/1
72 CAPSULE, GELATIN COATED ORAL
COMMUNITY
Start: 2024-10-14

## 2024-11-24 ENCOUNTER — PATIENT MESSAGE (OUTPATIENT)
Dept: OBSTETRICS AND GYNECOLOGY | Facility: CLINIC | Age: 49
End: 2024-11-24
Payer: COMMERCIAL

## 2024-11-25 DIAGNOSIS — Z00.00 ENCOUNTER FOR WELLNESS EXAMINATION: Primary | ICD-10-CM

## 2024-11-26 ENCOUNTER — PATIENT MESSAGE (OUTPATIENT)
Dept: OBSTETRICS AND GYNECOLOGY | Facility: CLINIC | Age: 49
End: 2024-11-26
Payer: COMMERCIAL

## 2024-11-26 DIAGNOSIS — R68.82 DECREASED LIBIDO: Primary | ICD-10-CM

## 2024-11-26 LAB
ABS NRBC COUNT: 0 X 10 3/UL (ref 0–0.01)
ABSOLUTE BASOPHIL: 0.03 X 10 3/UL (ref 0–0.22)
ABSOLUTE EOSINOPHIL: 0.09 X 10 3/UL (ref 0.04–0.54)
ABSOLUTE IMMATURE GRAN: 0.01 X 10 3/UL (ref 0–0.04)
ABSOLUTE LYMPHOCYTE: 1.62 X 10 3/UL (ref 0.86–4.75)
ABSOLUTE MONOCYTE: 0.36 X 10 3/UL (ref 0.22–1.08)
ALBUMIN SERPL-MCNC: 4.2 G/DL (ref 3.5–5.2)
ALBUMIN/GLOB SERPL ELPH: 1.6 {RATIO} (ref 1–2.7)
ALP ISOS SERPL LEV INH-CCNC: 109 U/L (ref 35–105)
ALT (SGPT): 8 U/L (ref 0–33)
ANION GAP SERPL CALC-SCNC: 10 MMOL/L (ref 8–17)
AST SERPL-CCNC: 10 U/L (ref 0–32)
BASOPHILS NFR BLD: 0.6 % (ref 0.2–1.2)
BILIRUBIN, TOTAL: 0.27 MG/DL (ref 0–1.2)
BUN/CREAT SERPL: 8.8 (ref 6–20)
CALCIUM SERPL-MCNC: 9.3 MG/DL (ref 8.6–10.2)
CARBON DIOXIDE, CO2: 27 MMOL/L (ref 22–29)
CHLORIDE: 104 MMOL/L (ref 98–107)
CHOLEST SERPL-MSCNC: 212 MG/DL (ref 100–200)
CREAT SERPL-MCNC: 1.1 MG/DL (ref 0.5–0.9)
EOSINOPHIL NFR BLD: 1.8 % (ref 0.7–7)
GFR ESTIMATION: 62.37 ML/MIN/1.73M2
GLOBULIN: 2.6 G/DL (ref 1.5–4.5)
GLUCOSE: 89 MG/DL (ref 74–106)
HCT VFR BLD AUTO: 40.7 % (ref 37–47)
HDLC SERPL-MCNC: 61 MG/DL
HGB BLD-MCNC: 13.1 G/DL (ref 12–16)
IMMATURE GRANULOCYTES: 0.2 % (ref 0–0.5)
LDL/HDL RATIO: 2.1 (ref 1–3)
LDLC SERPL CALC-MCNC: 129 MG/DL (ref 0–100)
LYMPHOCYTES NFR BLD: 31.6 % (ref 19.3–53.1)
MCH RBC QN AUTO: 28.1 PG (ref 27–32)
MCHC RBC AUTO-ENTMCNC: 32.2 G/DL (ref 32–36)
MCV RBC AUTO: 87.2 FL (ref 82–100)
MONOCYTES NFR BLD: 7 % (ref 4.7–12.5)
NEUTROPHILS # BLD AUTO: 3.01 X 10 3/UL (ref 2.15–7.56)
NEUTROPHILS NFR BLD: 58.8 % (ref 34–71.1)
NUCLEATED RED BLOOD CELLS: 0 /100 WBC (ref 0–0.2)
PLATELET # BLD AUTO: 253 X 10 3/UL (ref 135–400)
POTASSIUM: 3.9 MMOL/L (ref 3.5–5.1)
PROT SNV-MCNC: 6.8 G/DL (ref 6.4–8.3)
RBC # BLD AUTO: 4.67 X 10 6/UL (ref 4.2–5.4)
RDW-SD: 42.8 FL (ref 37–54)
SODIUM: 141 MMOL/L (ref 136–145)
TRIGL SERPL-MCNC: 110 MG/DL (ref 0–150)
TSH SERPL DL<=0.005 MIU/L-ACNC: 3.55 UIU/ML (ref 0.27–4.2)
UREA NITROGEN (BUN): 9.7 MG/DL (ref 6–20)
WBC # BLD: 5.12 X 10 3/UL (ref 4.3–10.8)

## 2024-11-27 RX ORDER — TESTOSTERONE
POWDER (GRAM) MISCELLANEOUS
Qty: 30 EACH | Refills: 5 | Status: SHIPPED | OUTPATIENT
Start: 2024-11-27

## 2024-12-05 DIAGNOSIS — N64.89 OTHER SPECIFIED DISORDERS OF BREAST: Primary | ICD-10-CM

## 2024-12-17 ENCOUNTER — PATIENT MESSAGE (OUTPATIENT)
Dept: OBSTETRICS AND GYNECOLOGY | Facility: CLINIC | Age: 49
End: 2024-12-17
Payer: COMMERCIAL

## 2024-12-24 ENCOUNTER — PATIENT MESSAGE (OUTPATIENT)
Dept: OBSTETRICS AND GYNECOLOGY | Facility: CLINIC | Age: 49
End: 2024-12-24
Payer: COMMERCIAL